# Patient Record
Sex: MALE | Race: WHITE | NOT HISPANIC OR LATINO | Employment: FULL TIME | URBAN - METROPOLITAN AREA
[De-identification: names, ages, dates, MRNs, and addresses within clinical notes are randomized per-mention and may not be internally consistent; named-entity substitution may affect disease eponyms.]

---

## 2023-07-11 ENCOUNTER — HOSPITAL ENCOUNTER (EMERGENCY)
Facility: HOSPITAL | Age: 48
Discharge: HOME | End: 2023-07-11
Attending: EMERGENCY MEDICINE
Payer: COMMERCIAL

## 2023-07-11 VITALS
HEIGHT: 75 IN | SYSTOLIC BLOOD PRESSURE: 133 MMHG | BODY MASS INDEX: 27.25 KG/M2 | HEART RATE: 89 BPM | RESPIRATION RATE: 20 BRPM | DIASTOLIC BLOOD PRESSURE: 85 MMHG | WEIGHT: 219.13 LBS | TEMPERATURE: 98 F | OXYGEN SATURATION: 96 %

## 2023-07-11 DIAGNOSIS — S05.01XA ABRASION OF RIGHT CORNEA, INITIAL ENCOUNTER: Primary | ICD-10-CM

## 2023-07-11 DIAGNOSIS — T15.91XA FOREIGN BODY OF RIGHT EYE, INITIAL ENCOUNTER: ICD-10-CM

## 2023-07-11 PROCEDURE — 25000003 PHARM REV CODE 250: Performed by: NURSE PRACTITIONER

## 2023-07-11 PROCEDURE — 99284 EMERGENCY DEPT VISIT MOD MDM: CPT

## 2023-07-11 RX ORDER — HYDROCODONE BITARTRATE AND ACETAMINOPHEN 5; 325 MG/1; MG/1
1 TABLET ORAL EVERY 4 HOURS PRN
Qty: 18 TABLET | Refills: 0 | Status: SHIPPED | OUTPATIENT
Start: 2023-07-11

## 2023-07-11 RX ORDER — TETRACAINE HYDROCHLORIDE 5 MG/ML
2 SOLUTION OPHTHALMIC
Status: DISCONTINUED | OUTPATIENT
Start: 2023-07-11 | End: 2023-07-12 | Stop reason: HOSPADM

## 2023-07-11 RX ORDER — HYDROCODONE BITARTRATE AND ACETAMINOPHEN 5; 325 MG/1; MG/1
1 TABLET ORAL
Status: COMPLETED | OUTPATIENT
Start: 2023-07-11 | End: 2023-07-11

## 2023-07-11 RX ORDER — ERYTHROMYCIN 5 MG/G
OINTMENT OPHTHALMIC
Qty: 3.5 G | Refills: 0 | Status: SHIPPED | OUTPATIENT
Start: 2023-07-11

## 2023-07-11 RX ORDER — ERYTHROMYCIN 5 MG/G
OINTMENT OPHTHALMIC
Status: COMPLETED | OUTPATIENT
Start: 2023-07-11 | End: 2023-07-11

## 2023-07-11 RX ADMIN — HYDROCODONE BITARTRATE AND ACETAMINOPHEN 1 TABLET: 5; 325 TABLET ORAL at 11:07

## 2023-07-11 RX ADMIN — ERYTHROMYCIN 1 INCH: 5 OINTMENT OPHTHALMIC at 11:07

## 2023-07-12 NOTE — ED PROVIDER NOTES
Encounter Date: 7/11/2023       History     Chief Complaint   Patient presents with    Eye Pain     R eye irritation x1 hr ago.      Patient is a 48-year-old male who presents with pain to the right eye.  States that he has a foreign body in his eye.  Noticed it tonight.  States that he is a .  Denies any relief with over-the-counter medications.  Patient shows no signs of distress at this time.    Review of patient's allergies indicates:  No Known Allergies  No past medical history on file.  No past surgical history on file.  No family history on file.     Review of Systems   Constitutional:  Negative for fever.   HENT:  Negative for sore throat.    Eyes:  Positive for pain (right).   Respiratory:  Negative for shortness of breath.    Cardiovascular:  Negative for chest pain.   Gastrointestinal:  Negative for nausea.   Genitourinary:  Negative for dysuria.   Musculoskeletal:  Negative for back pain.   Skin:  Negative for rash.   Neurological:  Negative for weakness.   Hematological:  Does not bruise/bleed easily.     Physical Exam     Initial Vitals [07/11/23 2253]   BP Pulse Resp Temp SpO2   133/85 89 18 98.1 °F (36.7 °C) 96 %      MAP       --         Physical Exam    Nursing note and vitals reviewed.  Constitutional: He appears well-developed and well-nourished.   HENT:   Head: Normocephalic and atraumatic.   Eyes: EOM are normal. Pupils are equal, round, and reactive to light. Foreign body present in the right eye. Right conjunctiva is injected.   Slit lamp exam:       The right eye shows corneal abrasion and fluorescein uptake.       Neck: Neck supple.   Normal range of motion.  Cardiovascular:  Normal rate, regular rhythm, normal heart sounds and intact distal pulses.           Pulmonary/Chest: Breath sounds normal.   Abdominal: Abdomen is soft. Bowel sounds are normal.   Musculoskeletal:         General: Normal range of motion.      Cervical back: Normal range of motion and neck supple.      Neurological: He is alert and oriented to person, place, and time. He has normal strength and normal reflexes.   Skin: Skin is warm and dry. Capillary refill takes less than 2 seconds.   Psychiatric: He has a normal mood and affect. His behavior is normal. Judgment and thought content normal.       ED Course   Procedures  Labs Reviewed - No data to display       Imaging Results    None          Medications   TETRAcaine HCl (PF) 0.5 % Drop 2 drop (has no administration in time range)   fluorescein ophthalmic strip 1 each (has no administration in time range)   HYDROcodone-acetaminophen 5-325 mg per tablet 1 tablet (has no administration in time range)   erythromycin 5 mg/gram (0.5 %) ophthalmic ointment (has no administration in time range)     Medical Decision Making:   ED Management:  Patient instructed to take medications as prescribed and follow-up with ophthalmology tomorrow for foreign body removal.  Patient to return to the emergency room with any worsening symptoms.  Patient shows no signs of distress at time of discharge.                        Clinical Impression:   Final diagnoses:  [S05.01XA] Abrasion of right cornea, initial encounter (Primary)  [T15.91XA] Foreign body of right eye, initial encounter        ED Disposition Condition    Discharge Stable          ED Prescriptions       Medication Sig Dispense Start Date End Date Auth. Provider    erythromycin (ROMYCIN) ophthalmic ointment Place a 1/2 inch ribbon of ointment into the lower eyelid QID. 3.5 g 7/11/2023 -- Cuauhtemoc Adam NP    HYDROcodone-acetaminophen (NORCO) 5-325 mg per tablet Take 1 tablet by mouth every 4 (four) hours as needed for Pain. 18 tablet 7/11/2023 -- Cuauhtemoc Adam NP          Follow-up Information       Follow up With Specialties Details Why Contact Info    Elaina Veliz MD Internal Medicine  As needed [address]  [address]  [facility] [id number] [phone]               Cuauhtemoc Adam  DANIELE  07/11/23 2314

## 2024-11-08 ENCOUNTER — HOSPITAL ENCOUNTER (OUTPATIENT)
Dept: RADIOLOGY | Facility: HOSPITAL | Age: 49
Discharge: HOME | End: 2024-11-08
Attending: PHYSICAL MEDICINE & REHABILITATION
Payer: COMMERCIAL

## 2024-11-08 ENCOUNTER — TELEPHONE (OUTPATIENT)
Dept: PAIN MEDICINE | Facility: CLINIC | Age: 49
End: 2024-11-08
Payer: COMMERCIAL

## 2024-11-08 ENCOUNTER — OFFICE VISIT (OUTPATIENT)
Dept: PHYSICAL MEDICINE AND REHAB | Facility: CLINIC | Age: 49
End: 2024-11-08
Payer: COMMERCIAL

## 2024-11-08 VITALS — BODY MASS INDEX: 27.14 KG/M2 | WEIGHT: 218.25 LBS | RESPIRATION RATE: 13 BRPM | HEIGHT: 75 IN

## 2024-11-08 DIAGNOSIS — M47.892 OTHER OSTEOARTHRITIS OF SPINE, CERVICAL REGION: Primary | ICD-10-CM

## 2024-11-08 DIAGNOSIS — M47.26 OSTEOARTHRITIS OF SPINE WITH RADICULOPATHY, LUMBAR REGION: ICD-10-CM

## 2024-11-08 DIAGNOSIS — M79.18 DIFFUSE MYOFASCIAL PAIN SYNDROME: ICD-10-CM

## 2024-11-08 DIAGNOSIS — M47.892 OTHER OSTEOARTHRITIS OF SPINE, CERVICAL REGION: ICD-10-CM

## 2024-11-08 PROBLEM — E78.2 MIXED HYPERLIPIDEMIA: Status: ACTIVE | Noted: 2022-04-29

## 2024-11-08 PROBLEM — F41.9 ANXIETY: Status: ACTIVE | Noted: 2020-11-19

## 2024-11-08 PROBLEM — Z87.891 PERSONAL HISTORY OF NICOTINE DEPENDENCE: Status: ACTIVE | Noted: 2022-04-29

## 2024-11-08 PROCEDURE — 72052 X-RAY EXAM NECK SPINE 6/>VWS: CPT | Mod: 26,,, | Performed by: RADIOLOGY

## 2024-11-08 PROCEDURE — 72052 X-RAY EXAM NECK SPINE 6/>VWS: CPT | Mod: TC

## 2024-11-08 PROCEDURE — 72114 X-RAY EXAM L-S SPINE BENDING: CPT | Mod: TC

## 2024-11-08 PROCEDURE — 99999 PR PBB SHADOW E&M-EST. PATIENT-LVL IV: CPT | Mod: PBBFAC,,, | Performed by: PHYSICAL MEDICINE & REHABILITATION

## 2024-11-08 PROCEDURE — 72114 X-RAY EXAM L-S SPINE BENDING: CPT | Mod: 26,,, | Performed by: RADIOLOGY

## 2024-11-08 RX ORDER — KETOROLAC TROMETHAMINE 10 MG/1
10 TABLET, FILM COATED ORAL 2 TIMES DAILY
Qty: 10 TABLET | Refills: 0 | Status: SHIPPED | OUTPATIENT
Start: 2024-11-08 | End: 2024-11-13

## 2024-11-08 RX ORDER — FLUTICASONE PROPIONATE 50 MCG
1 SPRAY, SUSPENSION (ML) NASAL DAILY
COMMUNITY

## 2024-11-08 RX ORDER — GABAPENTIN 300 MG/1
300 CAPSULE ORAL NIGHTLY
Qty: 30 CAPSULE | Refills: 1 | Status: SHIPPED | OUTPATIENT
Start: 2024-11-08

## 2024-11-08 RX ORDER — TIZANIDINE 2 MG/1
2-4 TABLET ORAL NIGHTLY PRN
Qty: 30 TABLET | Refills: 1 | Status: SHIPPED | OUTPATIENT
Start: 2024-11-08

## 2024-11-08 NOTE — PROGRESS NOTES
PM&R NEW PATIENT HISTORY & PHYSICAL :    Referring Physician:    Chief Complaint   Patient presents with    Back Pain       HPI: This is a 49 y.o.  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  He has a history of neck/back pain off/on for years due to past injuries. He feels tightness and achy pain-worse with a position change. At times he has pain radiating into his post thigh.  Position change provide some relief.      History obtained from patient    Functional History:  Walking: Not limited  Transfers: Independent  Assistive devices: No  Power mobility: No  Falls: None   Directional preference:sitting, flexion  Employment status:     Needs help with:  Nothing - all ADLS normal    Past family, medical, social, and surgical history reviewed in chart    Review of Systems:     General- denies lethargy, weight change, fever, chills  Head/neck- denies swallowing difficulties  ENT- denies hearing changes  Cardiovascular-denies chest pain  Pulmonary- denies shortness of breath  GI- denies constipation or bowel incontinence  - denies bladder incontinence  Skin- denies wounds or rashes  Musculoskeletal- denies weakness, + pain  Neurologic- +/- numbness and tingling  Psychiatric- denies depressive or psychotic features, denies anxiety  Lymphatic-denies swelling  Endocrine- denies hypoglycemic symptoms/DM history  All other pertinent systems negative     Physical Examination:  General: Well developed, well nourished male, NAD  HEENT:NCAT EOMI bilaterally   Pulmonary:Normal respirations    Spinal Examination: CERVICAL  Active ROM is within limited at endranges  Inspection: No deformity of spinal alignment.  Palpation: No vertebral tenderness to percussion.   Tight and ttp at trapezius, rhomboids  Spurling test: neg    Spinal Examination: LUMBAR or THORACIC  Active ROM is limited in extension and mild in flexion  Inspection: No deformity of spinal  alignment.  No palpable olisthesis.  Palpation: No vertebral tenderness to percussion.  Very tight and ttp along paraspinals, ttp at si joints and glut musculature  TERRY: +bilaterally  +slump test-worse on right  SLR Test (seated and supine):midl +bilaterally    Musculoskeletal Tests:    Elbow compression (ulnar): +on right  Tinels at wrist: neg  Phalen: neg    Bilateral Upper and Lower Extremities:  Pulses are 2+ at radial bilaterally.  Shoulder/Elbow/Wrist/Hand ROM wnl  Hip/Knee/Ankle ROM wnl  Bilateral Extremities show normal capillary refill.  No signs of cyanosis, rubor, edema, skin changes, or dysvascular changes of appendages.  Nails appear intact.    Neurological Exam:  Cranial Nerves:  II-XII grossly intact    Manual Muscle Testing: (Motor 5=normal)    RIGHT Upper extremity: Shoulder abduction 5/5, Biceps 5/5, Triceps 5/5, Wrist extension 5/5, Abductor pollicis brevis 5/5, Ulnar hand intrinsics 5/5,  LEFT Upper extremity: Shoulder abduction 5/5, Biceps 5/5, Triceps 5/5, Wrist extension 5/5, Abductor pollicis brevis 5/5, Ulnar hand intrinsics 5/5,  RIGHT Lower extremity: Hip flexion 5/5, Hip Abduction 4/5, Hip Adduction 4/5, Knee extension 4/5, Knee flexion 5/5, Ankle dorsiflexion 5/5, Extensor hallucis longus 5/5, Ankle plantarflexion 5/5  LEFT Lower extremity:  Hip flexion 5/5, Hip Abduction 4/5,Hip Adduction 4/5, Knee extension 4/5, Knee flexion 5/5, Ankle dorsiflexion 5/5, Extensor hallucis longus 5/5, Ankle plantarflexion 5/5    No focal atrophy is noted of either upper or lower extremity.    Bilateral Reflexes:   Musa's response is absent bilaterally.  No clonus at knee or ankle.    Sensation: tested to light touch  - intact in all four limbs.    Gait: Narrow base and good arm swing.      IMPRESSION/PLAN: This is a 49 y.o.  male with lumbar DJD/DDD, radiculitis, cervical DJD, myofasical pain  Discussed in detail for 30 minutes about diagnosis and treatment plan    Rx for PT-Lira--Core/hip  strengthening, myofascial release, dry needle, stretch, posture, modalities, HEP  2. Handouts on neck/back exercises, stretches provided  3.  Natural ant inflammatory and muscle health supplements recommended. Ketorolac 10mg BID x5 days, [id number] QHS prn, gabapentin 300mg QHS  4. Referral to IPM  5. Xray C and Lspine today    Bette Broussard M.D.  Physical Medicine and Rehab

## 2024-11-15 ENCOUNTER — CLINICAL SUPPORT (OUTPATIENT)
Dept: REHABILITATION | Facility: HOSPITAL | Age: 49
End: 2024-11-15
Attending: PHYSICAL MEDICINE & REHABILITATION
Payer: COMMERCIAL

## 2024-11-15 DIAGNOSIS — M79.18 DIFFUSE MYOFASCIAL PAIN SYNDROME: ICD-10-CM

## 2024-11-15 DIAGNOSIS — M47.892 OTHER OSTEOARTHRITIS OF SPINE, CERVICAL REGION: ICD-10-CM

## 2024-11-15 DIAGNOSIS — G89.29 CHRONIC RIGHT-SIDED LOW BACK PAIN WITHOUT SCIATICA: Primary | ICD-10-CM

## 2024-11-15 DIAGNOSIS — R53.1 WEAKNESS: ICD-10-CM

## 2024-11-15 DIAGNOSIS — M54.2 CERVICAL PAIN: ICD-10-CM

## 2024-11-15 DIAGNOSIS — M54.50 CHRONIC RIGHT-SIDED LOW BACK PAIN WITHOUT SCIATICA: Primary | ICD-10-CM

## 2024-11-15 DIAGNOSIS — M47.26 OSTEOARTHRITIS OF SPINE WITH RADICULOPATHY, LUMBAR REGION: ICD-10-CM

## 2024-11-15 PROCEDURE — 97140 MANUAL THERAPY 1/> REGIONS: CPT | Mod: PN

## 2024-11-15 PROCEDURE — 20560 NDL INSJ W/O NJX 1 OR 2 MUSC: CPT | Mod: PN,CG

## 2024-11-15 PROCEDURE — 97161 PT EVAL LOW COMPLEX 20 MIN: CPT | Mod: PN

## 2024-11-15 PROCEDURE — 97110 THERAPEUTIC EXERCISES: CPT | Mod: PN

## 2024-11-15 NOTE — PLAN OF CARE
[facility]   Physical Therapy Initial Evaluation     Date: 11/15/2024   Name: Walter Jang  Clinic Number: [id number]    Therapy Diagnosis:   Encounter Diagnoses   Name Primary?    Other osteoarthritis of spine, cervical region     Osteoarthritis of spine with radiculopathy, lumbar region     Diffuse myofascial pain syndrome     Chronic right-sided low back pain without sciatica Yes    Cervical pain     Weakness      Physician: Bette Broussard MD    Physician Orders: PT Eval and Treat  Medical Diagnosis from Referral:   M47.892 (ICD-10-CM) - Other osteoarthritis of spine, cervical region   M47.26 (ICD-10-CM) - Osteoarthritis of spine with radiculopathy, lumbar region   M79.18 (ICD-10-CM) - Diffuse myofascial pain syndrome   Evaluation Date: 11/15/2024  Authorization Period Expiration: 11/8/25  Plan of Care Expiration: 12/13/24  Progress Note Due: Every 6th visit  Visit # / Visits authorized: 1/1   FOTO: 1/3    Precautions: Standard     Time In: 9:30 AM  Time Out: 10:45 AM  Total Appointment Time (timed & untimed codes): 75 minutes      SUBJECTIVE     Date of onset: Flare up about 2 weeks     History of current condition - Walter reports: Pt reports significant pain in the R side of his lower back. Pt reports pain in B hips into the back of his legs. Pt reports neck and shoulder pain. 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 Pt reports mild numbness/tingling in his hands when he wakes up in the morning but thinks this is due to sleeping positions. Pt denies particular CAYETANO but reports increase in pain this past Thursday. Pt reports walking bent over due to pain on Thursday but reports things loosened up into the evening on Monday.    Pt reports improvements in symptoms with recent medications. Pt reports more symptoms in the morning and improvements  "throughout the day and into the evening. Pt reports minimal stretching prior to performance.     Falls: 0    Imaging: X-ray of cervical spine 11/8/24, "1.  No evidence of acute injury.  2.  Disc degeneration as described above from C4 through C7 including mild bilateral foraminal stenoses at C6-7.  3.  Normal alignment is maintained over the full range of motion."  X-ray of lumbar spine 11/8/24, "1.  Chronic disc degeneration from L4 through S1.  2.  No evidence of acute injury.  3.  Normal alignment is maintained over the range of motion."    Prior Therapy: Some PT  Social History: Lives with family  Occupation:   Prior Level of Function: Independent without difficulty  Current Level of Function: Modified independence    Pain:  Current 7/10, worst 10/10, best 1/10   Location: Lower back (R), neck, shoulders  Description: Sharp/aching  Aggravating Factors: Work, prolonged static positions  Easing Factors: Medication    Patients goals: "Reduce pain so he can resume a normal walking pattern."     Medical History:   No past medical history on file.    Surgical History:   Walter Jang  has no past surgical history on file.    Medications:   Walter has a current medication list which includes the following prescription(s): erythromycin, fluticasone propionate, gabapentin, and tizanidine.    Allergies:   Review of patient's allergies indicates:   Allergen Reactions    Codeine Anxiety    Guaifenesin Palpitations     Mucinex D          OBJECTIVE   (NT = not tested due to pain and/or inability to obtain test position)    RANGE OF MOTION:    Lumbar   Range of Motion Right  (spine) Left Comment Goal   Lumbar Flexion (60º) 50%  LBP 50º  Functional   Nonpainful   Lumbar Extension (30º) 25%  LBP 20º  Functional   Nonpainful   Lumbar Side Bending (25º) 75%* 75% Pulling when leaning to L 20º  Functional   Nonpainful     STRENGTH:    Lower Extremity  Strength RIGHT   Goal   Hip Flexion  []1  []2  []3  [x]4  []5              " "  [x]+ []- 5/5 B    Hip Extension * []1  []2  [x]3  []4  []5                [x]+ []- 5/5 B   Hip Abduction  []1  []2  []3  [x]4  []5                []+ [x]- 5/5 B       Lower Extremity  Strength LEFT   Goal   Hip Flexion  []1  []2  []3  [x]4  []5                [x]+ []- 5/5 B    Hip Extension  []1  []2  [x]3  []4  []5                [x]+ []- 5/5 B   Hip Abduction  []1  []2  []3  [x]4  []5                []+ [x]- 5/5 B     JOINT MOBILITY:     Unable to accurately assess due to pain and muscle guarding present throughout the lumbar spine.     Palpation: Increased tone and tenderness noted with palpation to: R lumbar paraspinals     Observation: Frequent change in position during subjective portion in exam when seated in chair with B arm rests and back rest. Pt requires increased time with rolling and supine <-> sit transitions.     Gait Analysis: Patient presents with mild forward trunk lean.     FUNCTION:     Intake Outcome Measure for FOTO Lumbar Spine/Cervical Spine Survey    Therapist reviewed FOTO scores for Walter Jang on 11/15/2024.   FOTO documents entered into [employer] - see Media section.    Intake Score: 50%       TREATMENT     Total Treatment time (time-based codes) separate from Evaluation: 30 minutes      Walter received the treatments listed below:      therapeutic exercises to develop strength, endurance, ROM, and flexibility for 15 minutes including:  HEP   LTRs (15 x)   Supine TA contraction (2 x 10, 10")   Supine isometric hip abduction (10 x 10")    manual therapy techniques: Joint mobilizations, Manual traction, and Soft tissue Mobilization were applied to the: low back for 15 minutes, including:  STM to R lumbar paraspinal  TPDN to R lumbar paraspinal    neuromuscular re-education activities to improve: Balance, Coordination, Kinesthetic, Sense, Proprioception, and Posture for - minutes. The following activities were included:  -    therapeutic activities to improve functional performance for -  " minutes, including:  -    gait training to improve functional mobility and safety for -  minutes, including:  -      PATIENT EDUCATION AND HOME EXERCISES     Education provided:   Patient educated on the impairments noted above and the effects of physical therapy intervention to improve overall condition and QOL.   Patient was educated on all the above exercise prior/during/after for proper posture, positioning, and execution for safe performance with home exercise program.   Patient educated on proper ergonomics at the work station in order to maintain optimal alignment of the musculoskeletal system and improve efficiency in the work environment.  Patient educated on the importance of strong core and lower extremity musculature in order to improve both static and dynamic balance, improve gait mechanics, reduce fall risk and improve household and community mobility.      Written Home Exercises Provided: yes. Exercises were reviewed and Walter was able to demonstrate them prior to the end of the session.  Walter demonstrated good  understanding of the education provided. See EMR under Patient Instructions for exercises provided during therapy sessions.    ASSESSMENT     Walter is a 49 y.o. male referred to outpatient Physical Therapy with a medical diagnosis of M47.892 (ICD-10-CM) - Other osteoarthritis of spine, cervical region, M47.26 (ICD-10-CM) - Osteoarthritis of spine with radiculopathy, lumbar region, and M79.18 (ICD-10-CM) - Diffuse myofascial pain syndrome. Patient presents with limitations to lumbar spine mobility due to pain, decreased hip strength B, and difficulty with walking with upright posture due to pain. Pt's current symptom limit their ability to efficiently and independently complete ADLs. Pt to benefit from skilled PT to address aforementioned deficits and promote optimal function.    Patient prognosis is Good.   Patient will benefit from skilled outpatient Physical Therapy to address the deficits  stated above and in the chart below, provide patient /family education, and to maximize patientt's level of independence.     Plan of care discussed with patient: Yes  Patient's spiritual, cultural and educational needs considered and patient is agreeable to the plan of care and goals as stated below:     Anticipated Barriers for therapy: Recurrent LBP    Medical Necessity is demonstrated by the following  History  Co-morbidities and personal factors that may impact the plan of care [x] LOW: no personal factors / co-morbidities  [] MODERATE: 1-2 personal factors / co-morbidities  [] HIGH: 3+ personal factors / co-morbidities    Moderate / High Support Documentation:   Co-morbidities affecting plan of care: -    Personal Factors:   no deficits     Examination  Body Structures and Functions, activity limitations and participation restrictions that may impact the plan of care [x] LOW: addressing 1-2 elements  [] MODERATE: 3+ elements  [] HIGH: 4+ elements (please support below)    Moderate / High Support Documentation: -     Clinical Presentation [x] LOW: stable  [] MODERATE: Evolving  [] HIGH: Unstable     Decision Making/ Complexity Score: low       GOALS:  SHORT TERM GOALS: 2 weeks Progress   Recent signs and systems trend is improving in order to progress towards Long term goals's.    Patient will be independent with Home Exercise Program  in order to further progress and return to maximal function.    Pain rating at Worst: 5/10 in order to progress towards increased independence with activity.    Patient will be able to correct postural deviations in sitting and standing, to decrease pain and promote postural awareness for injury prevention.     Patient will partially meet predicted functional outcome (FOTO) score: 59% to improve towards increasing self-worth & perceived functional ability.      LONG TERM GOALS: 4 weeks, (12/13/25) Progress   Patient will return to normal Activities of daily living, recreational,  "and work related activities with less pain and limitation.     Patient will improve Range of Motion to stated goals in order to return to maximal functional potential.     Patient will improve Strength to stated goals of appropriate musculature in order to improve functional independence.     Pain Rating at Best: 1/10 to improve Quality of Life.     Patient will meet predicted functional outcome (FOTO) score: 68% to increase self-worth & perceived functional ability.    Patient will have met/partially met personal goal of: "Reduce pain so he can resume a normal walking pattern."         PLAN   Plan of care Certification: 11/15/2024 to 12/13/25.    Outpatient Physical Therapy 2 times weekly for 4 weeks to include the following interventions: Cervical/Lumbar Traction, Electrical Stimulation , Gait Training, Manual Therapy, Moist Heat/ Ice, Neuromuscular Re-ed, Orthotic Management and Training, Patient Education, Self Care, Therapeutic Activities, Therapeutic Exercise, and FDN    Jackie Baxter, PT      I CERTIFY THE NEED FOR THESE SERVICES FURNISHED UNDER THIS PLAN OF TREATMENT AND WHILE UNDER MY CARE   Physician's comments:     Physician's Signature: ___________________________________________________      "

## 2024-12-01 DIAGNOSIS — M79.18 DIFFUSE MYOFASCIAL PAIN SYNDROME: ICD-10-CM

## 2024-12-02 RX ORDER — TIZANIDINE 2 MG/1
2-4 TABLET ORAL NIGHTLY PRN
Qty: 90 TABLET | Refills: 1 | Status: SHIPPED | OUTPATIENT
Start: 2024-12-02